# Patient Record
Sex: MALE | Race: WHITE | NOT HISPANIC OR LATINO | Employment: FULL TIME | URBAN - METROPOLITAN AREA
[De-identification: names, ages, dates, MRNs, and addresses within clinical notes are randomized per-mention and may not be internally consistent; named-entity substitution may affect disease eponyms.]

---

## 2021-07-30 ENCOUNTER — TRANSFERRED RECORDS (OUTPATIENT)
Dept: HEALTH INFORMATION MANAGEMENT | Facility: CLINIC | Age: 61
End: 2021-07-30

## 2021-07-30 ENCOUNTER — HOSPITAL ENCOUNTER (EMERGENCY)
Facility: CLINIC | Age: 61
Discharge: ANOTHER HOSPITAL | End: 2021-07-30
Attending: FAMILY MEDICINE | Admitting: FAMILY MEDICINE
Payer: MEDICAID

## 2021-07-30 ENCOUNTER — APPOINTMENT (OUTPATIENT)
Dept: GENERAL RADIOLOGY | Facility: CLINIC | Age: 61
End: 2021-07-30
Attending: FAMILY MEDICINE
Payer: MEDICAID

## 2021-07-30 ENCOUNTER — APPOINTMENT (OUTPATIENT)
Dept: CT IMAGING | Facility: CLINIC | Age: 61
End: 2021-07-30
Attending: FAMILY MEDICINE
Payer: MEDICAID

## 2021-07-30 VITALS
RESPIRATION RATE: 29 BRPM | HEART RATE: 94 BPM | OXYGEN SATURATION: 99 % | TEMPERATURE: 97.3 F | DIASTOLIC BLOOD PRESSURE: 99 MMHG | WEIGHT: 150 LBS | SYSTOLIC BLOOD PRESSURE: 152 MMHG

## 2021-07-30 DIAGNOSIS — I21.4 NSTEMI (NON-ST ELEVATED MYOCARDIAL INFARCTION) (H): ICD-10-CM

## 2021-07-30 DIAGNOSIS — R79.89 ELEVATED TROPONIN: ICD-10-CM

## 2021-07-30 DIAGNOSIS — I10 SEVERE HYPERTENSION: ICD-10-CM

## 2021-07-30 DIAGNOSIS — R10.13 EPIGASTRIC PAIN: ICD-10-CM

## 2021-07-30 DIAGNOSIS — Z82.49 FAMILY HISTORY OF MI (MYOCARDIAL INFARCTION): ICD-10-CM

## 2021-07-30 DIAGNOSIS — F10.10 ALCOHOL ABUSE: ICD-10-CM

## 2021-07-30 DIAGNOSIS — Z72.0 TOBACCO ABUSE: ICD-10-CM

## 2021-07-30 LAB
ALBUMIN SERPL-MCNC: 3.5 G/DL (ref 3.4–5)
ALP SERPL-CCNC: 52 U/L (ref 40–150)
ALT SERPL W P-5'-P-CCNC: 49 U/L (ref 0–70)
ANION GAP SERPL CALCULATED.3IONS-SCNC: 7 MMOL/L (ref 3–14)
ANION GAP SERPL CALCULATED.3IONS-SCNC: 7 MMOL/L (ref 3–14)
AST SERPL W P-5'-P-CCNC: 49 U/L (ref 0–45)
BASOPHILS # BLD AUTO: 0.1 10E3/UL (ref 0–0.2)
BASOPHILS NFR BLD AUTO: 1 %
BILIRUB SERPL-MCNC: 1.9 MG/DL (ref 0.2–1.3)
BUN SERPL-MCNC: 12 MG/DL (ref 7–30)
BUN SERPL-MCNC: 12 MG/DL (ref 7–30)
CALCIUM SERPL-MCNC: 9 MG/DL (ref 8.5–10.1)
CALCIUM SERPL-MCNC: 9 MG/DL (ref 8.5–10.1)
CHLORIDE BLD-SCNC: 101 MMOL/L (ref 94–109)
CHLORIDE BLD-SCNC: 101 MMOL/L (ref 94–109)
CO2 SERPL-SCNC: 30 MMOL/L (ref 20–32)
CO2 SERPL-SCNC: 30 MMOL/L (ref 20–32)
CREAT SERPL-MCNC: 0.94 MG/DL (ref 0.66–1.25)
CREAT SERPL-MCNC: 0.94 MG/DL (ref 0.66–1.25)
EOSINOPHIL # BLD AUTO: 0.1 10E3/UL (ref 0–0.7)
EOSINOPHIL NFR BLD AUTO: 1 %
ERYTHROCYTE [DISTWIDTH] IN BLOOD BY AUTOMATED COUNT: 14.3 % (ref 10–15)
GFR SERPL CREATININE-BSD FRML MDRD: 88 ML/MIN/1.73M2
GFR SERPL CREATININE-BSD FRML MDRD: 88 ML/MIN/1.73M2
GLUCOSE BLD-MCNC: 100 MG/DL (ref 70–99)
GLUCOSE BLD-MCNC: 100 MG/DL (ref 70–99)
HCT VFR BLD AUTO: 44.7 % (ref 40–53)
HGB BLD-MCNC: 15.3 G/DL (ref 13.3–17.7)
HOLD SPECIMEN: NORMAL
HOLD SPECIMEN: NORMAL
IMM GRANULOCYTES # BLD: 0 10E3/UL
IMM GRANULOCYTES NFR BLD: 0 %
LACTATE SERPL-SCNC: 1.4 MMOL/L (ref 0.7–2)
LIPASE SERPL-CCNC: 129 U/L (ref 73–393)
LYMPHOCYTES # BLD AUTO: 2.2 10E3/UL (ref 0.8–5.3)
LYMPHOCYTES NFR BLD AUTO: 21 %
MCH RBC QN AUTO: 34.1 PG (ref 26.5–33)
MCHC RBC AUTO-ENTMCNC: 34.2 G/DL (ref 31.5–36.5)
MCV RBC AUTO: 100 FL (ref 78–100)
MONOCYTES # BLD AUTO: 0.9 10E3/UL (ref 0–1.3)
MONOCYTES NFR BLD AUTO: 8 %
NEUTROPHILS # BLD AUTO: 7.5 10E3/UL (ref 1.6–8.3)
NEUTROPHILS NFR BLD AUTO: 69 %
NRBC # BLD AUTO: 0 10E3/UL
NRBC BLD AUTO-RTO: 0 /100
NT-PROBNP SERPL-MCNC: 8946 PG/ML (ref 0–900)
PLATELET # BLD AUTO: 203 10E3/UL (ref 150–450)
POTASSIUM BLD-SCNC: 3.4 MMOL/L (ref 3.4–5.3)
POTASSIUM BLD-SCNC: 3.4 MMOL/L (ref 3.4–5.3)
PROT SERPL-MCNC: 6.4 G/DL (ref 6.8–8.8)
RBC # BLD AUTO: 4.49 10E6/UL (ref 4.4–5.9)
SARS-COV-2 RNA RESP QL NAA+PROBE: NEGATIVE
SODIUM SERPL-SCNC: 138 MMOL/L (ref 133–144)
SODIUM SERPL-SCNC: 138 MMOL/L (ref 133–144)
TROPONIN I SERPL-MCNC: 0.26 UG/L (ref 0–0.04)
WBC # BLD AUTO: 10.7 10E3/UL (ref 4–11)

## 2021-07-30 PROCEDURE — 82040 ASSAY OF SERUM ALBUMIN: CPT | Performed by: FAMILY MEDICINE

## 2021-07-30 PROCEDURE — 96366 THER/PROPH/DIAG IV INF ADDON: CPT

## 2021-07-30 PROCEDURE — 258N000003 HC RX IP 258 OP 636: Performed by: FAMILY MEDICINE

## 2021-07-30 PROCEDURE — 36415 COLL VENOUS BLD VENIPUNCTURE: CPT | Performed by: FAMILY MEDICINE

## 2021-07-30 PROCEDURE — 250N000011 HC RX IP 250 OP 636: Performed by: FAMILY MEDICINE

## 2021-07-30 PROCEDURE — C9803 HOPD COVID-19 SPEC COLLECT: HCPCS

## 2021-07-30 PROCEDURE — 83605 ASSAY OF LACTIC ACID: CPT | Performed by: FAMILY MEDICINE

## 2021-07-30 PROCEDURE — 74177 CT ABD & PELVIS W/CONTRAST: CPT

## 2021-07-30 PROCEDURE — 96375 TX/PRO/DX INJ NEW DRUG ADDON: CPT

## 2021-07-30 PROCEDURE — 84484 ASSAY OF TROPONIN QUANT: CPT | Performed by: FAMILY MEDICINE

## 2021-07-30 PROCEDURE — 87635 SARS-COV-2 COVID-19 AMP PRB: CPT | Performed by: FAMILY MEDICINE

## 2021-07-30 PROCEDURE — 93010 ELECTROCARDIOGRAM REPORT: CPT | Performed by: FAMILY MEDICINE

## 2021-07-30 PROCEDURE — 83690 ASSAY OF LIPASE: CPT | Performed by: FAMILY MEDICINE

## 2021-07-30 PROCEDURE — 99292 CRITICAL CARE ADDL 30 MIN: CPT | Mod: 25

## 2021-07-30 PROCEDURE — 83880 ASSAY OF NATRIURETIC PEPTIDE: CPT | Performed by: FAMILY MEDICINE

## 2021-07-30 PROCEDURE — 99291 CRITICAL CARE FIRST HOUR: CPT | Mod: 25 | Performed by: FAMILY MEDICINE

## 2021-07-30 PROCEDURE — 80053 COMPREHEN METABOLIC PANEL: CPT | Performed by: FAMILY MEDICINE

## 2021-07-30 PROCEDURE — 96361 HYDRATE IV INFUSION ADD-ON: CPT

## 2021-07-30 PROCEDURE — 96365 THER/PROPH/DIAG IV INF INIT: CPT | Mod: 59

## 2021-07-30 PROCEDURE — 71045 X-RAY EXAM CHEST 1 VIEW: CPT

## 2021-07-30 PROCEDURE — 93005 ELECTROCARDIOGRAM TRACING: CPT

## 2021-07-30 PROCEDURE — 85025 COMPLETE CBC W/AUTO DIFF WBC: CPT | Performed by: FAMILY MEDICINE

## 2021-07-30 PROCEDURE — 99291 CRITICAL CARE FIRST HOUR: CPT | Mod: 25

## 2021-07-30 PROCEDURE — 250N000013 HC RX MED GY IP 250 OP 250 PS 637: Performed by: FAMILY MEDICINE

## 2021-07-30 RX ORDER — ASPIRIN 81 MG/1
324 TABLET, CHEWABLE ORAL ONCE
Status: COMPLETED | OUTPATIENT
Start: 2021-07-30 | End: 2021-07-30

## 2021-07-30 RX ORDER — LORAZEPAM 2 MG/ML
1 INJECTION INTRAMUSCULAR ONCE
Status: COMPLETED | OUTPATIENT
Start: 2021-07-30 | End: 2021-07-30

## 2021-07-30 RX ORDER — HEPARIN SODIUM 10000 [USP'U]/100ML
0-5000 INJECTION, SOLUTION INTRAVENOUS CONTINUOUS
Status: DISCONTINUED | OUTPATIENT
Start: 2021-07-30 | End: 2021-07-30 | Stop reason: HOSPADM

## 2021-07-30 RX ORDER — ONDANSETRON 2 MG/ML
4 INJECTION INTRAMUSCULAR; INTRAVENOUS EVERY 30 MIN PRN
Status: DISCONTINUED | OUTPATIENT
Start: 2021-07-30 | End: 2021-07-30 | Stop reason: HOSPADM

## 2021-07-30 RX ORDER — NITROGLYCERIN 20 MG/100ML
10-200 INJECTION INTRAVENOUS CONTINUOUS
Status: DISCONTINUED | OUTPATIENT
Start: 2021-07-30 | End: 2021-07-30 | Stop reason: HOSPADM

## 2021-07-30 RX ORDER — HYDROMORPHONE HYDROCHLORIDE 1 MG/ML
0.5 INJECTION, SOLUTION INTRAMUSCULAR; INTRAVENOUS; SUBCUTANEOUS
Status: DISCONTINUED | OUTPATIENT
Start: 2021-07-30 | End: 2021-07-30 | Stop reason: HOSPADM

## 2021-07-30 RX ORDER — KETOROLAC TROMETHAMINE 15 MG/ML
15 INJECTION, SOLUTION INTRAMUSCULAR; INTRAVENOUS ONCE
Status: DISCONTINUED | OUTPATIENT
Start: 2021-07-30 | End: 2021-07-30 | Stop reason: HOSPADM

## 2021-07-30 RX ORDER — IOPAMIDOL 755 MG/ML
500 INJECTION, SOLUTION INTRAVASCULAR ONCE
Status: COMPLETED | OUTPATIENT
Start: 2021-07-30 | End: 2021-07-30

## 2021-07-30 RX ADMIN — NITROGLYCERIN 55 MCG/MIN: 20 INJECTION INTRAVENOUS at 09:44

## 2021-07-30 RX ADMIN — NITROGLYCERIN 45 MCG/MIN: 20 INJECTION INTRAVENOUS at 09:21

## 2021-07-30 RX ADMIN — HEPARIN SODIUM 800 UNITS/HR: 10000 INJECTION, SOLUTION INTRAVENOUS at 08:20

## 2021-07-30 RX ADMIN — ASPIRIN 324 MG: 81 TABLET, CHEWABLE ORAL at 08:05

## 2021-07-30 RX ADMIN — NITROGLYCERIN 15 MCG/MIN: 20 INJECTION INTRAVENOUS at 08:34

## 2021-07-30 RX ADMIN — NITROGLYCERIN 50 MCG/MIN: 20 INJECTION INTRAVENOUS at 09:26

## 2021-07-30 RX ADMIN — LORAZEPAM 1 MG: 2 INJECTION INTRAMUSCULAR; INTRAVENOUS at 08:42

## 2021-07-30 RX ADMIN — NITROGLYCERIN 35 MCG/MIN: 20 INJECTION INTRAVENOUS at 09:08

## 2021-07-30 RX ADMIN — NITROGLYCERIN 20 MCG/MIN: 20 INJECTION INTRAVENOUS at 08:48

## 2021-07-30 RX ADMIN — NITROGLYCERIN 40 MCG/MIN: 20 INJECTION INTRAVENOUS at 09:16

## 2021-07-30 RX ADMIN — HYDROMORPHONE HYDROCHLORIDE 0.5 MG: 1 INJECTION, SOLUTION INTRAMUSCULAR; INTRAVENOUS; SUBCUTANEOUS at 07:50

## 2021-07-30 RX ADMIN — NITROGLYCERIN 10 MCG/MIN: 20 INJECTION INTRAVENOUS at 08:18

## 2021-07-30 RX ADMIN — SODIUM CHLORIDE 1000 ML: 9 INJECTION, SOLUTION INTRAVENOUS at 07:50

## 2021-07-30 RX ADMIN — ONDANSETRON 4 MG: 2 INJECTION INTRAMUSCULAR; INTRAVENOUS at 07:47

## 2021-07-30 RX ADMIN — NITROGLYCERIN 25 MCG/MIN: 20 INJECTION INTRAVENOUS at 08:56

## 2021-07-30 RX ADMIN — NITROGLYCERIN 30 MCG/MIN: 20 INJECTION INTRAVENOUS at 09:02

## 2021-07-30 RX ADMIN — IOPAMIDOL 75 ML: 755 INJECTION, SOLUTION INTRAVENOUS at 09:32

## 2021-07-30 ASSESSMENT — ENCOUNTER SYMPTOMS
NAUSEA: 1
ABDOMINAL PAIN: 1
VOMITING: 1

## 2021-07-30 NOTE — ED PROVIDER NOTES
History     Chief Complaint   Patient presents with     Abdominal Pain     HPI  Joselito Rico is a 60 year old male who presents to the emergency department with a 5-day history of severe epigastric pain.  Monday he had epigastric pain but went to work but left because the pain increased with exertion and he had nausea and vomiting x3.  He has been sweating but states this is not unusual for him.  No fever.  The epigastric pain is been constant waxing and waning in severity between 4/10-10/10.  He has cardiac risk factors and a mother who  of an MI at 52, untreated hypertension, tobacco abuse and daily alcohol abuse.  He does not see a physician.  He drinks 4-5 drinks after work every day.  No history of pancreatitis or gallbladder disease.    Allergies:  Allergies   Allergen Reactions     Penicillins        Problem List:    Patient Active Problem List    Diagnosis Date Noted     Recurrent unilateral or unspecified inguinal hernia 2008     Priority: Medium     Problem list name updated by automated process. Provider to review          Past Medical History:    History reviewed. No pertinent past medical history.    Past Surgical History:    Past Surgical History:   Procedure Laterality Date     HC REPAIR ING HERNIA,5+Y/O,REDUCIBL  2004    Laparoscopic preperitoneal repair of bilateral inguinal hernias.     HC REPAIR RECURR INGUIN EDA,REDUCIBL  08    right inguinal hernia     HC REPAIR ROTATOR CUFF,ACUTE      Rotator Cuff Repair     ZZC NONSPECIFIC PROCEDURE      ORIF ankle fx       Family History:    No family history on file.    Social History:  Marital Status:   [4]  Social History     Tobacco Use     Smoking status: Current Every Day Smoker     Packs/day: 1.00     Years: 20.00     Pack years: 20.00     Types: Cigarettes     Smokeless tobacco: Never Used   Substance Use Topics     Alcohol use: Yes     Alcohol/week: 60.0 standard drinks     Comment: occasional     Drug use: No         Medications:    IBUPROFEN PO  TYLENOL 325 MG OR TABS          Review of Systems   Gastrointestinal: Positive for abdominal pain, nausea and vomiting.        Initially had 3 episodes of nausea and vomiting on Monday.  Has had some intermittent nausea since but no vomiting.  Had some loose stools on Monday but no diarrhea.  No hematemesis or bright red blood per rectum or melena   All other systems reviewed and are negative.      Physical Exam   BP: (!) 200/135  Pulse: (!) 123  Temp: 97.3  F (36.3  C)  Resp: 18  Weight: 68 kg (150 lb)  SpO2: 100 %      Physical Exam  Vitals and nursing note reviewed.   Constitutional:       Comments: Alert pleasant anxious 60-year-old who appears much older than stated age.  He is diaphoretic, tachycardic, hypertensive, hypoxic on room air at 88%,BP (!) 191/137   Pulse (!) 123   Temp 97.3  F (36.3  C) (Temporal)   Resp 18   Wt 68 kg (150 lb)   SpO2 97%      HENT:      Head: Normocephalic and atraumatic.      Mouth/Throat:      Mouth: Mucous membranes are moist.   Eyes:      Extraocular Movements: Extraocular movements intact.   Cardiovascular:      Rate and Rhythm: Regular rhythm. Tachycardia present.      Heart sounds: Normal heart sounds.   Pulmonary:      Effort: Pulmonary effort is normal.      Breath sounds: Normal breath sounds.   Abdominal:      General: Abdomen is flat. Bowel sounds are normal.      Palpations: Abdomen is soft.      Tenderness: There is abdominal tenderness in the epigastric area.   Skin:     General: Skin is warm and dry.      Capillary Refill: Capillary refill takes less than 2 seconds.   Neurological:      General: No focal deficit present.      Mental Status: He is alert.   Psychiatric:         Mood and Affect: Mood normal.         Behavior: Behavior normal.         ED Course          EKG Interpretation:      Interpreted by Storm Wynn MD  Symptoms at time of EKG: Epigastric pain  Rhythm: Sinus tachycardia  Rate: Tachycardia  Axis:  Normal  Ectopy: Premature ventricular contractions (unifocal)  Conduction: Nonspecific interventricular conduction block  ST Segments/ T Waves: ST Segement Elevation V3 and V4  Q Waves: III and aVf  Comparison to prior: Last EKG was 2004 and was normal    Clinical Impression: Sinus tachycardia at a rate of 119 with ST elevation in V3 and V4 and deep ST depression and flipped T waves in V5 and V6.  Interventricular conduction delay , .  New Q waves in 3 and F.  All signs of ischemia/infarction-suspect evolving process.  All new changes compared to 2004.         Procedures              Critical Care time:  60 min--patient was seen emergently, grossly abnormal vital signs and severe epigastric pain believed to be cardiac.  Emergent consultation with cardiology.  EKG changes.  Elevated troponin.               Results for orders placed or performed during the hospital encounter of 07/30/21 (from the past 24 hour(s))   CBC with Platelets & Differential    Narrative    The following orders were created for panel order CBC with Platelets & Differential.  Procedure                               Abnormality         Status                     ---------                               -----------         ------                     CBC with platelets and d...[[id number]]  Abnormal            Final result                 Please view results for these tests on the individual orders.   Basic metabolic panel   Result Value Ref Range    Sodium 138 133 - 144 mmol/L    Potassium 3.4 3.4 - 5.3 mmol/L    Chloride 101 94 - 109 mmol/L    Carbon Dioxide (CO2) 30 20 - 32 mmol/L    Anion Gap 7 3 - 14 mmol/L    Urea Nitrogen 12 7 - 30 mg/dL    Creatinine 0.94 0.66 - 1.25 mg/dL    Calcium 9.0 8.5 - 10.1 mg/dL    Glucose 100 (H) 70 - 99 mg/dL    GFR Estimate 88 >60 mL/min/1.73m2   Troponin I   Result Value Ref Range    Troponin I 0.257 (HH) 0.000 - 0.045 ug/L   Lipase   Result Value Ref Range    Lipase 129 73 - 393 U/L   CBC with  platelets and differential   Result Value Ref Range    WBC Count 10.7 4.0 - 11.0 10e3/uL    RBC Count 4.49 4.40 - 5.90 10e6/uL    Hemoglobin 15.3 13.3 - 17.7 g/dL    Hematocrit 44.7 40.0 - 53.0 %     78 - 100 fL    MCH 34.1 (H) 26.5 - 33.0 pg    MCHC 34.2 31.5 - 36.5 g/dL    RDW 14.3 10.0 - 15.0 %    Platelet Count 203 150 - 450 10e3/uL    % Neutrophils 69 %    % Lymphocytes 21 %    % Monocytes 8 %    % Eosinophils 1 %    % Basophils 1 %    % Immature Granulocytes 0 %    NRBCs per 100 WBC 0 <1 /100    Absolute Neutrophils 7.5 1.6 - 8.3 10e3/uL    Absolute Lymphocytes 2.2 0.8 - 5.3 10e3/uL    Absolute Monocytes 0.9 0.0 - 1.3 10e3/uL    Absolute Eosinophils 0.1 0.0 - 0.7 10e3/uL    Absolute Basophils 0.1 0.0 - 0.2 10e3/uL    Absolute Immature Granulocytes 0.0 <=0.0 10e3/uL    Absolute NRBCs 0.0 10e3/uL   Comprehensive metabolic panel   Result Value Ref Range    Sodium 138 133 - 144 mmol/L    Potassium 3.4 3.4 - 5.3 mmol/L    Chloride 101 94 - 109 mmol/L    Carbon Dioxide (CO2) 30 20 - 32 mmol/L    Anion Gap 7 3 - 14 mmol/L    Urea Nitrogen 12 7 - 30 mg/dL    Creatinine 0.94 0.66 - 1.25 mg/dL    Calcium 9.0 8.5 - 10.1 mg/dL    Glucose 100 (H) 70 - 99 mg/dL    Alkaline Phosphatase 52 40 - 150 U/L    AST 49 (H) 0 - 45 U/L    ALT 49 0 - 70 U/L    Protein Total 6.4 (L) 6.8 - 8.8 g/dL    Albumin 3.5 3.4 - 5.0 g/dL    Bilirubin Total 1.9 (H) 0.2 - 1.3 mg/dL    GFR Estimate 88 >60 mL/min/1.73m2   Nt probnp inpatient (BNP)   Result Value Ref Range    N terminal Pro BNP Inpatient 8,946 (H) 0 - 900 pg/mL   [place] Draw    Narrative    The following orders were created for panel order [place] Draw.  Procedure                               Abnormality         Status                     ---------                               -----------         ------                     Extra Blue Top Tube[[id number]]                              Final result               Extra Red Top Tube[[id number]]                               Final  result                 Please view results for these tests on the individual orders.   Extra Blue Top Tube   Result Value Ref Range    Hold Specimen JIC    Extra Red Top Tube   Result Value Ref Range    Hold Specimen JIC    Lactic acid whole blood   Result Value Ref Range    Lactic Acid 1.4 0.7 - 2.0 mmol/L   CT Abdomen Pelvis w Contrast    Narrative    CT ABDOMEN PELVIS W CONTRAST 7/30/2021 9:44 AM    CLINICAL HISTORY: Epigastric pain    TECHNIQUE: CT scan of the abdomen and pelvis was performed following  injection of IV contrast. Multiplanar reformats were obtained. Dose  reduction techniques were used.  CONTRAST: Isovue 370, 75mL    COMPARISON: 5/1/2008    FINDINGS:   LOWER CHEST: Partly visualized cardiomegaly. Small right and trace  left pleural effusion. Mild bibasilar dependent and compressive  atelectasis.    HEPATOBILIARY: Hepatic steatosis. No calcified gallstones or biliary  ductal dilatation.    PANCREAS: There is punctate calcification in the pancreatic tail. No  pancreatic duct dilatation or peripancreatic inflammatory changes.    SPLEEN: Normal.    ADRENAL GLANDS: Normal.    KIDNEYS/BLADDER: Right renal simple cyst requiring no specific  follow-up. No hydronephrosis or calculi.    BOWEL: Decompressed stomach. Large duodenal diverticulum arising at  the second portion of the duodenum. Normal caliber loops of small  bowel and colon. Normal appendix.    PELVIC ORGANS: Mild prostatomegaly.    ADDITIONAL FINDINGS: Mental abdominal hernia mesh. Small  fat-containing umbilical hernia. No free fluid in the abdomen and  pelvis. No fluid collections. No abdominal aortic aneurysm. No  lymphadenopathy.    MUSCULOSKELETAL: Unremarkable.      Impression    IMPRESSION:   1.  Partly visualized cardiomegaly, small right and trace left pleural  effusion and bibasilar atelectasis.  2.  No acute findings in the abdomen and pelvis.  3.  Hepatic steatosis.  4.  Small fat-containing umbilical hernia.    ERON NOE MD          SYSTEM ID:  [id number]   Asymptomatic COVID-19 Virus (Coronavirus) by PCR Nasopharyngeal    Specimen: Nasopharyngeal; Swab    Narrative    The following orders were created for panel order Asymptomatic COVID-19 Virus (Coronavirus) by PCR Nasopharyngeal.  Procedure                               Abnormality         Status                     ---------                               -----------         ------                     SARS-COV2 (COVID-19) Vir...[[id number]]                      In process                   Please view results for these tests on the individual orders.       Medications   ondansetron (ZOFRAN) injection 4 mg (4 mg Intravenous Given 7/30/21 0747)   HYDROmorphone (PF) (DILAUDID) injection 0.5 mg (0.5 mg Intravenous Given 7/30/21 0750)   ketorolac (TORADOL) injection 15 mg (15 mg Intravenous Not Given 7/30/21 0751)   heparin 25,000 units in 0.45% NaCl 250 mL ANTICOAGULANT infusion (800 Units/hr Intravenous New Bag 7/30/21 0820)   nitroGLYcerin 50 mg in D5W 250 mL (adult std) infusion CENTRAL (55 mcg/min Intravenous New Bag 7/30/21 0944)   0.9% sodium chloride BOLUS (0 mLs Intravenous Stopped 7/30/21 0951)   aspirin (ASA) chewable tablet 324 mg (324 mg Oral Given 7/30/21 0805)   heparin loading dose for LOW INTENSITY TREATMENT * Give BEFORE starting heparin infusion (4,100 Units Intravenous Given 7/30/21 0817)   LORazepam (ATIVAN) injection 1 mg (1 mg Intravenous Given 7/30/21 0842)   sodium chloride (PF) 0.9% PF flush 100 mL (60 mLs Intracatheter Given 7/30/21 0933)   sodium chloride (PF) 0.9% PF flush 3 mL (10 mLs Intravenous Given 7/30/21 0933)   iopamidol (ISOVUE-370) solution 500 mL (75 mLs Intravenous Given 7/30/21 0932)     8:32 AM--I discussed with cardiology  and hospitalist  at [place]. Patient will be transferred there emergently further cardiac services and cardiology and at patient's request.  Assessments & Plan (with Medical Decision Making)   MDM--60-year-old male  with 5-day history of severe epigastric pain of sudden onset and constant nature waxing waning in severity between 4/10 and 10/10 associated with nausea vomiting and diaphoresis with new EKG changes of sinus tachycardia, ST elevation in V3 V4 and ST depression and flipped T waves in V5 V6 and new Q waves inferiorly in 3 and F and an elevated troponin at 0.257 in a patient with a strong family history, smoking history, untreated hypertension and alcohol abuse.  Patient's blood pressures were 210/130 and he was given nitroglycerin and started on a nitro drip.  He was given aspirin 325 mg p.o. and a heparin drip.  I discussed the need to transfer him to a cardiac facility and the patient requested [facility].  [facility] contacted at 08 10 AM.  I talked with her cardiologist  at 08 20 who is also able to review his EKG.  He suspects that the EKG is an evolving left bundle branch block and that this is heart strain.  He felt it is not a STEMI but do treated as an NSTEMI.  He suspects the patient has underlying cardiovascular disease and will need transfer and eventually an angiogram but not emergently and recommended that we admit him to the hospitalist.  Discussed with  who agrees and accepts transfer of the patient.  CT scan shows no pathology related to his epigastric pain.  I suspect his epigastric pain is cardiac.  Patient's symptoms and blood pressure improved with IV Ativan 1 mg, nitro drip, aspirin 325 p.o. and a heparin drip.  Patient and wife are agreeable with transfer and all their questions answered to their satisfaction and the patient transferred in critical but improving condition.  I have reviewed the nursing notes.    I have reviewed the findings, diagnosis, plan and need for follow up with the patient.       New Prescriptions    No medications on file       Final diagnoses:   Epigastric pain   NSTEMI (non-ST elevated myocardial infarction) (H)   Elevated troponin   Tobacco abuse   Alcohol  abuse   Family history of MI (myocardial infarction)   Severe hypertension       7/30/2021   [facility] EMERGENCY DEPT     Kingsley, Storm MOHAN MD  07/30/21 1019

## 2021-07-30 NOTE — ED NOTES
Pt still diaphoretic and hypertensive. Patient nitroglycerin titrated up. Patient and family updated on condition.

## 2021-07-30 NOTE — ED NOTES
Pt transferring to [facility] for higher level of care. Patient family notified that EMS has an ETA of 1 hour.

## 2021-07-30 NOTE — ED NOTES
Pt resting after Ativan administration. Lights turned down. Pt states he hasn't been able to sleep for a week.

## 2021-07-30 NOTE — ED NOTES
Assumed care of patient. Patient appears diaphoretic and sweating. Nitroglycerin infusing. Patient given Ativan and explained the medication and side effects. Patient pillow case changed out due to being drenched in sweat.

## 2021-07-30 NOTE — ED NOTES
Writer has been 1:1 with patient since 0730. Bedside report to Justyna ROBERTSON, transferred care she will remain 1:1 with patient. Coral Mata RN

## 2021-07-30 NOTE — ED NOTES
Report from Leigh GEE RN assumed care. Dr. Wynn is with patient. Patient is very diaphoretic, reports epigastric pain to be between a 4 and an 8. He states it gets worse and then gets better. Coral Mata RN

## 2021-08-03 ENCOUNTER — TRANSFERRED RECORDS (OUTPATIENT)
Dept: HEALTH INFORMATION MANAGEMENT | Facility: CLINIC | Age: 61
End: 2021-08-03

## 2021-08-04 ENCOUNTER — TRANSFERRED RECORDS (OUTPATIENT)
Dept: HEALTH INFORMATION MANAGEMENT | Facility: CLINIC | Age: 61
End: 2021-08-04

## 2021-08-05 LAB
CREATININE (EXTERNAL): 0.86 MG/DL (ref 0.72–1.25)
GFR ESTIMATED (EXTERNAL): >60 ML/MIN/1.73M(2)
GLUCOSE (EXTERNAL): 88 MG/DL (ref 70–100)
HBA1C MFR BLD: 5.3 %
POTASSIUM (EXTERNAL): 3.8 MMOL/L (ref 3.5–5.1)

## 2021-08-26 ENCOUNTER — OFFICE VISIT (OUTPATIENT)
Dept: FAMILY MEDICINE | Facility: CLINIC | Age: 61
End: 2021-08-26
Payer: MEDICAID

## 2021-08-26 VITALS
BODY MASS INDEX: 23.03 KG/M2 | WEIGHT: 138.2 LBS | HEART RATE: 80 BPM | SYSTOLIC BLOOD PRESSURE: 122 MMHG | HEIGHT: 65 IN | TEMPERATURE: 97.8 F | OXYGEN SATURATION: 98 % | RESPIRATION RATE: 20 BRPM | DIASTOLIC BLOOD PRESSURE: 84 MMHG

## 2021-08-26 DIAGNOSIS — Z95.820 S/P ANGIOPLASTY WITH STENT: Primary | ICD-10-CM

## 2021-08-26 DIAGNOSIS — I50.22 CHRONIC SYSTOLIC HEART FAILURE (H): ICD-10-CM

## 2021-08-26 DIAGNOSIS — E46 PROTEIN-CALORIE MALNUTRITION, UNSPECIFIED SEVERITY (H): ICD-10-CM

## 2021-08-26 DIAGNOSIS — F10.29 ALCOHOL DEPENDENCE WITH UNSPECIFIED ALCOHOL-INDUCED DISORDER (H): ICD-10-CM

## 2021-08-26 DIAGNOSIS — F17.200 SMOKER: ICD-10-CM

## 2021-08-26 PROBLEM — I25.10 CORONARY ATHEROSCLEROSIS: Status: ACTIVE | Noted: 2021-08-02

## 2021-08-26 PROBLEM — R79.89 ELEVATED TROPONIN: Status: ACTIVE | Noted: 2021-07-30

## 2021-08-26 PROBLEM — I16.1 HYPERTENSIVE EMERGENCY: Status: ACTIVE | Noted: 2021-07-30

## 2021-08-26 PROBLEM — I07.1 TRICUSPID VALVE REGURGITATION: Status: ACTIVE | Noted: 2021-08-02

## 2021-08-26 PROBLEM — I34.0 MITRAL VALVE REGURGITATION: Status: ACTIVE | Noted: 2021-08-02

## 2021-08-26 PROBLEM — Z72.0 TOBACCO USE: Status: ACTIVE | Noted: 2021-07-30

## 2021-08-26 PROBLEM — I51.9 LEFT VENTRICULAR DIASTOLIC DYSFUNCTION: Status: ACTIVE | Noted: 2021-08-02

## 2021-08-26 PROBLEM — I51.89 RIGHT VENTRICULAR SYSTOLIC DYSFUNCTION: Status: ACTIVE | Noted: 2021-08-02

## 2021-08-26 PROBLEM — F10.20 ALCOHOL DEPENDENCE (H): Status: ACTIVE | Noted: 2021-08-26

## 2021-08-26 PROBLEM — J96.01 ACUTE RESPIRATORY FAILURE WITH HYPOXIA (H): Status: ACTIVE | Noted: 2021-07-30

## 2021-08-26 PROBLEM — I65.23 BILATERAL CAROTID ARTERY STENOSIS: Status: ACTIVE | Noted: 2021-08-03

## 2021-08-26 PROBLEM — I42.9 CARDIOMYOPATHY (H): Status: ACTIVE | Noted: 2021-08-02

## 2021-08-26 PROBLEM — I51.7 CONCENTRIC LEFT VENTRICULAR HYPERTROPHY: Status: ACTIVE | Noted: 2021-08-02

## 2021-08-26 PROBLEM — F10.90 HEAVY ALCOHOL CONSUMPTION: Status: ACTIVE | Noted: 2021-08-26

## 2021-08-26 PROBLEM — Z82.49 FAMILY HISTORY OF EARLY CAD: Status: ACTIVE | Noted: 2021-08-03

## 2021-08-26 PROCEDURE — 99204 OFFICE O/P NEW MOD 45 MIN: CPT | Performed by: PHYSICIAN ASSISTANT

## 2021-08-26 RX ORDER — ASPIRIN 81 MG/1
81 TABLET, CHEWABLE ORAL
COMMUNITY
Start: 2021-08-06 | End: 2022-08-06

## 2021-08-26 RX ORDER — SPIRONOLACTONE 25 MG/1
12.5 TABLET ORAL
COMMUNITY
Start: 2021-08-05 | End: 2022-08-05

## 2021-08-26 RX ORDER — ATORVASTATIN CALCIUM 10 MG/1
10 TABLET, FILM COATED ORAL
COMMUNITY
Start: 2021-08-06 | End: 2022-08-06

## 2021-08-26 RX ORDER — FAMOTIDINE 20 MG/1
20 TABLET, FILM COATED ORAL
COMMUNITY
Start: 2021-08-05 | End: 2021-09-04

## 2021-08-26 RX ORDER — CARVEDILOL 12.5 MG/1
12.5 TABLET ORAL
COMMUNITY
Start: 2021-08-05 | End: 2022-08-05

## 2021-08-26 RX ORDER — CLOPIDOGREL BISULFATE 75 MG/1
75 TABLET ORAL
COMMUNITY
Start: 2021-08-06 | End: 2022-08-06

## 2021-08-26 RX ORDER — LOSARTAN POTASSIUM 25 MG/1
25 TABLET ORAL
COMMUNITY
Start: 2021-08-06 | End: 2022-08-06

## 2021-08-26 RX ORDER — ISOSORBIDE MONONITRATE 30 MG/1
30 TABLET, EXTENDED RELEASE ORAL
COMMUNITY
Start: 2021-08-06 | End: 2022-08-06

## 2021-08-26 RX ORDER — FUROSEMIDE 20 MG
20 TABLET ORAL
COMMUNITY
Start: 2021-08-06 | End: 2022-08-06

## 2021-08-26 ASSESSMENT — PAIN SCALES - GENERAL: PAINLEVEL: NO PAIN (0)

## 2021-08-26 ASSESSMENT — MIFFLIN-ST. JEOR: SCORE: 1363.75

## 2021-08-26 NOTE — NURSING NOTE
Health Maintenance Due   Topic Date Due     ANNUAL REVIEW OF HM ORDERS  Never done     ADVANCE CARE PLANNING  Never done     Pneumococcal Vaccine: Pediatrics (0 to 5 Years) and At-Risk Patients (6 to 64 Years) (1 of 2 - PPSV23) Never done     COLORECTAL CANCER SCREENING  Never done     COVID-19 Vaccine (1) Never done     HIV SCREENING  Never done     HEPATITIS C SCREENING  Never done     DTAP/TDAP/TD IMMUNIZATION (1 - Tdap) Never done     LIPID  Never done     PREVENTIVE CARE VISIT  06/19/2009     ZOSTER IMMUNIZATION (1 of 2) Never done     INFLUENZA VACCINE (1) 09/01/2021     Yaneth CALIX LPN

## 2021-08-26 NOTE — PROGRESS NOTES
Assessment & Plan     S/P angioplasty with stent  Chronic systolic heart failure (H)  Patient was admitted to [facility] following findings of elevated troponin and hypertensive emergency at [facility] ED. He underwent PCI with stent placement. Following this patient was found to have runs of Mobitz II. He has since completed a holter, this was submitted last week. Scheduled to follow up with cardiology on 09/03. He was not clear on his medications today. These were reviewed, explaining the purpose of each. Exam unremarkable. No changes to treatment plan at this time.     Smoker  Patient is a pack a day smoker for at least 20 years. We discussed the importance of quitting smoking to reduce risk for subsequent infarctions or other health complications. He says that he has reduced his smoking, but has not quit. I did offer various therapies such as patches, gum, chantix or zyban. He declined these at this time. I also advised him to ask his partner to quit smoking with him.     Alcohol dependence with unspecified alcohol-induced disorder (H)  Patient's partner reported that he has continued to consume alcohol. We discussed reaching out to sobriety programs such as [facility] or [facility] for additional supports. Advised patient to abstain from further alcohol consumption.     Protein-calorie malnutrition, unspecified severity (H)  Patient did assert that he has been making efforts to improve his diet including eating more fruits and vegetables. Also, he says that he has been monitoring his salt to ensure that he does not consume more than 2000mg daily. I encouraged him to continue to maintain a healthy diet low in salts/sugars/fatty&greasy foods with regular servings of fruits and vegetables and try to get in 30 minutes of aerobic exercise 5 or more days each week as able.     Tobacco Cessation:   reports that he has been smoking cigarettes. He has a 20.00 pack-year smoking history. He has never  used smokeless tobacco.  Tobacco Cessation Action Plan: Information offered: Patient not interested at this time        Return in about 8 days (around 9/3/2021) for Cardiology.    NELLY Moore [facility]    Abundio Yee is a 60 year old who presents for the following health issues     HPI       Hospital Follow-up Visit:    Hospital/Nursing Home/IP Rehab Facility: [facility]  Date of Admission: 7-  Date of Discharge: 8-5-2021  Reason(s) for Admission: Stent placement      Was your hospitalization related to COVID-19? No   Problems taking medications regularly:  None  Medication changes since discharge: None  Problems adhering to non-medication therapy:  None    Summary of hospitalization:  CareEverywhere information obtained and reviewed  Diagnostic Tests/Treatments reviewed.  Follow up needed: Cardiology, EP, rehab  Other Healthcare Providers Involved in Patient s Care:         Specialist appointment - See Chart review  Update since discharge: stable.   Post Discharge Medication Reconciliation: discharge medications reconciled, continue medications without change.  Plan of care communicated with patient and girlfriend          Patient is a 60 year old male who presents with girlfriend for follow up on recent hospitalization. He presented to the [facility] ED on July 30th with complaint of epigastric pain. Testing notable for elevated troponins, ST elevations in V3 & V4, ST depression and inverted Twaves in V5 & V6 and new Q waves in III and aVF. He was transferred to [facility] were he underwent PCI and stent placement due to significant multivessel obstructive CAD. Following the procedure he was noted to be having runs of Mobitz II overnights. He says that he has worn a holter for 2 weeks, submitted last week. He has yet to hear the results. In review of the hospital discharge summary I see that it was thought that the patient may need ICD and/or  pacemaker.    He says that he was told if he did not quit smoking he would likely die. I did inform him that continued smoking does place him at risk of worsening CAD and other health complications which certainly can result in death. I discussed with him some of the strategies for smoking cessation and offered treatment, he declined.     He girlfriend, who accompanied him to the visit, was forthcoming about his continued alcohol. We discussed the negative effects that this has on the heart as well as other organs. I reviewed with the patient local support programs and advised him to reach out to one of the them to help with his sobriety efforts.     Hospital Course   Mr. Rico is a 59 yo male admitted for evaluation of epigastric pain and shortness of breath.  Medical issues include Tobacco Use, Alcohol Abuse.  Expressed having epigastric pain over the last several days.  Though not consistently, he indicated it is sometimes associated with exertion.  Having associated shortness of breath and diaphoresis.  Also has had nausea with several episodes of emesis.  Denied radiation of the pain.  On evaluation at an outside hospital his troponin was elevated at 0.257.  CT abdomen pelvis was completed and unremarkable aside from cardiomegaly with small bilateral pleural effusions.  However he was noted to be markedly hypertensive with a blood pressure of 210/140.  The patient was started on a nitroglycerin infusion and heparin infusion and transferred here for further evaluation.  Patient issues assessed as potentially arising from hypertensive emergency which itself was related to significant fluid overload.  Diuresed with furosemide infusion; stopped 08/01.  Cardiac catheterization 08/02 showing the patient to have significant multivessel obstructive coronary artery disease.      Cardiothoracic surgery consulted to discuss possibility of bypass. It was felt patient would have similar outcomes from bypass or PCI.  Patient taken to the Cath Lab 8/4 for stent placement. Patient started on Plavix therapy.     Newly having runs of Mobitz type II overnights 08/02 to 08/03.  Electrophysiology consulted, patient is need for pacemaker placement for bradycardia reassessed following revascularization. Recommendation for Holter monitor at discharge. He will be seen in EP clinic in 3 months to reassess for possible ICD placement.    Patient's vital signs were stable and no further interventions recommended from the cardiology service. Patient able to discharge home and will have follow-up with cardiology and EP. Patient also follow-up with primary care physician.    8:32 AM--I discussed with cardiology  and hospitalist  at [place]. Patient will be transferred there emergently further cardiac services and cardiology and at patient's request.  Assessments & Plan (with Medical Decision Making)   MDM--60-year-old male with 5-day history of severe epigastric pain of sudden onset and constant nature waxing waning in severity between 4/10 and 10/10 associated with nausea vomiting and diaphoresis with new EKG changes of sinus tachycardia, ST elevation in V3 V4 and ST depression and flipped T waves in V5 V6 and new Q waves inferiorly in 3 and F and an elevated troponin at 0.257 in a patient with a strong family history, smoking history, untreated hypertension and alcohol abuse.  Patient's blood pressures were 210/130 and he was given nitroglycerin and started on a nitro drip.  He was given aspirin 325 mg p.o. and a heparin drip.  I discussed the need to transfer him to a cardiac facility and the patient requested [facility].  [facility] contacted at 08 10 AM.  I talked with her cardiologist  at 08 20 who is also able to review his EKG.  He suspects that the EKG is an evolving left bundle branch block and that this is heart strain.  He felt it is not a STEMI but do treated as an NSTEMI.  He suspects the patient has underlying  "cardiovascular disease and will need transfer and eventually an angiogram but not emergently and recommended that we admit him to the hospitalist.  Discussed with  who agrees and accepts transfer of the patient.  CT scan shows no pathology related to his epigastric pain.  I suspect his epigastric pain is cardiac.  Patient's symptoms and blood pressure improved with IV Ativan 1 mg, nitro drip, aspirin 325 p.o. and a heparin drip.  Patient and wife are agreeable with transfer and all their questions answered to their satisfaction and the patient transferred in critical but improving condition.      Review of Systems   Constitutional, HEENT, cardiovascular, pulmonary, gi and gu systems are negative, except as otherwise noted.      Objective    /84 (BP Location: Left arm, Patient Position: Chair, Cuff Size: Adult Large)   Pulse 80   Temp 97.8  F (36.6  C) (Temporal)   Resp 20   Ht 1.651 m (5' 5\")   Wt 62.7 kg (138 lb 3.2 oz)   SpO2 98%   BMI 23.00 kg/m    Body mass index is 23 kg/m .  Physical Exam   GENERAL: healthy, alert and no distress  EYES: Eyes grossly normal to inspection, PERRL and conjunctivae and sclerae normal  HENT: ear canals and TM's normal, nose and mouth without ulcers or lesions  NECK: no adenopathy, no asymmetry, masses, or scars and thyroid normal to palpation  RESP: lungs clear to auscultation - no rales, rhonchi or wheezes  CV: regular rate and rhythm, normal S1 S2, no S3 or S4, no murmur, click or rub, no peripheral edema and peripheral pulses strong  MS: no gross musculoskeletal defects noted, no edema  NEURO: Normal strength and tone, mentation intact and speech normal  PSYCH: mentation appears normal, affect normal/bright             "

## 2021-08-26 NOTE — PATIENT INSTRUCTIONS
Patient Education     Resources to Help You Quit Smoking  If you have quit smoking or are thinking about quitting, congratulations! It can be hard to quit smoking, but the benefits are well worth it. To help you quit and stay smoke-free, there are many resources that can help.  Your health plan  If you have health insurance, call them for more details about their phone coaching programs.    Blue [employer] and Blue [facility]: 1-187-043-BLUE    [employer]: 7-699-782-QUIT    [facility]: 6-437-950-BLUE    [employer]: [phone]    [employer]: [phone]    [facility] members: [phone]    [facility]: [phone]    [facility]: [phone]    [facility]: [phone]  American Cancer Society: [phone]  The American Cancer Society can help you find local resources to quit smoking.  [employer]: 2-488-295-PLAN (8399)  Offers a telephone helpline, gum, patches and lozenges. These services are free for the uninsured and those without coverage. The online program is free to everyone at www.[employer].com.  American Lung Association: 0-932-EGKB-USA (820-3170)  Provides a lung helpline as well as an online program, self-help book and group clinic support for quitting smoking. www.lung.org/stop-smoking  National Cancer [place]: 8-192-192-QUIT (9276)  Offers a telephone hotline, online text chat and a website with tools, information and support for smokers who want to quit. www.smokefree.gov  Medication Therapy Management:  [phone] ([facility])  [phone] ([place])  This is a clinic program to help you quit smoking. It offers one-on-one sessions with a pharmacist.  [facility] Group Tobacco Cessation: [phone]  Small group sessions held throughout the Health system area for people who are trying to live free from tobacco. www.[employer].org/exhale  Call to reserve your spot or for additional information.  For  informational purposes only. Not to replace the advice of your health care provider.  Copyright   2013 [facility]. All rights reserved. [employer] 605057 - Rev 12/15.           Patient Education     How to Quit Smoking  Smoking is a hard habit to break. About 50% of all people who have ever smoked have been able to quit. Most people who still smoke want to quit. Here are some of the best ways to stop smoking.     Keep in mind the health benefits of quitting  The health benefits of quitting start right away. They keep improving the longer you go without smoking. Knowing this can help inspire you to stay on track. These benefits occur at any age. If you are 17 or 70, quitting is a good choice. Some of the health benefits after your last cigarette include:     After 20 minutes: Your blood pressure and pulse return to normal.    After 8 hours: Your oxygen levels return to normal.    After 2 days: Your ability to smell and taste start to improve as damaged nerves regrow.    After 2 to 3 weeks: Your circulation and lung function improve.    After 1 to 9 months: Your coughing, congestion, and shortness of breath decrease. Your tiredness decreases.    After 1 year: Your risk of heart attack decreases by 50%.    After 5 years: Your risk of lung cancer decreases by 50%. Your risk of stroke becomes the same as a nonsmoker s.  Go cold turkey  Most former smokers quit cold turkey. This means stopping all at once. Trying to cut back slowly often doesn't work as well. This may be because it continues the habit of smoking. Also you may inhale more smoke while smoking fewer cigarettes. This leads to the same amount of nicotine in your body.   Get support  Support programs can be a big help, especially for heavy smokers. These groups offer lectures, ways to change behavior, and peer support. Here are some ways to find a support program:     Free national quitline 800-QUIT-NOW (960-629-0124)    [facility] quit-smoking  programs    American Lung Association 550-229-7409    American Cancer Society [phone]  Support at home is important too. Family and friends can offer praise and reassurance. If the smoker in your life finds it hard to quit, encourage them to keep trying.   Try over-the-counter medicine  Nicotine replacement therapy may make it easier to quit. Some aids are available without a prescription. These include a nicotine patch, gum, and lozenges. But it's best to use these under the care of your healthcare provider. The skin patch gives a steady supply of nicotine. Nicotine gum and lozenges give short-time doses of low levels of nicotine. Both methods reduce the craving for cigarettes. If you have nausea, vomiting, dizziness, weakness, or a fast heartbeat, stop using these products. See your provider.   Ask about prescription medicine  After reviewing your smoking patterns and past attempts to quit, your healthcare provider may offer a prescription medicine such as bupropion, varenicline, a nicotine inhaler, or nasal spray. Each has advantages and side effects. Your provider can review these with you.   Keep trying  Most smokers make many attempts at quitting before they succeed. It s important not to give up.   To learn more  For more on how to quit smoking, try these resources:     www.cdc.gov/tobacco/quit_smoking/ 800-QUIT-NOW (778-315-6410)    www.smokefree.gov 877-44U-QUIT (222-958-7826)    www.lung.org/stop-smoking/ 800-LUNGUSA (663-714-3391)  Jose last reviewed this educational content on 12/1/2019 2000-2021 [employer]. All rights reserved. This information is not intended as a substitute for professional medical care. Always follow your healthcare professional's instructions.